# Patient Record
(demographics unavailable — no encounter records)

---

## 2025-02-18 NOTE — HEALTH RISK ASSESSMENT
[2 - 4 times a month (2 pts)] : 2-4 times a month (2 points) [3 or 4 (1 pt)] : 3 or 4  (1 point) [Never (0 pts)] : Never (0 points) [Yes] : In the past 12 months have you used drugs other than those required for medical reasons? Yes [Patient reported PAP Smear was normal] : Patient reported PAP Smear was normal [Never] : Never [0] : 2) Feeling down, depressed, or hopeless: Not at all (0) [PHQ-2 Negative - No further assessment needed] : PHQ-2 Negative - No further assessment needed [None] : None [Employed] : employed [Single] : single [Fully functional (bathing, dressing, toileting, transferring, walking, feeding)] : Fully functional (bathing, dressing, toileting, transferring, walking, feeding) [Fully functional (using the telephone, shopping, preparing meals, housekeeping, doing laundry, using] : Fully functional and needs no help or supervision to perform IADLs (using the telephone, shopping, preparing meals, housekeeping, doing laundry, using transportation, managing medications and managing finances) [Audit-CScore] : 3 [de-identified] : Smokes Marijuana  [de-identified] : walks alot at work [de-identified] : has increased candy intake [[id number]] : 0 [Reports changes in hearing] : Reports no changes in hearing [Reports changes in vision] : Reports no changes in vision [Reports changes in dental health] : Reports no changes in dental health [PapSmearDate] : 02/2024 [de-identified] : MILAN

## 2025-02-18 NOTE — HISTORY OF PRESENT ILLNESS
[FreeTextEntry1] : NPA-CPE [de-identified] : 26 year F presents for annual physical exam. 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  sleeps 7-8 hours, interrupted sleep. takes 1-2 hours to fall asleep.  sometimes feels anxious at night. Sometimes marijuana helps. Magnesium helps, but she is not consistent  Reports healthy diet. sometimes skips meals with work. Has been less active this year- has gained weight, about 10lbs She notes she moved in with boyfriend and has been eating alot of candy and sugar lately

## 2025-03-21 NOTE — HISTORY OF PRESENT ILLNESS
[FreeTextEntry8] : Acute care visit Traveled to [facility] on 3/4 with boyfriend. She stayed in an AirBnB and ate locally.  She has had diarrhea since then.  Abdominal cramping, bloating, loose stool quickly after eating anything.  denies fever, chills, weight loss.

## 2025-06-27 NOTE — HISTORY OF PRESENT ILLNESS
[Other Location: e.g. School (Enter Location, City,State)___] : at [unfilled], at the time of the visit. [Medical Office: ([facility])___] : at the medical office located in  [Telehealth (audio & video)] : This visit was provided via telehealth using real-time 2-way audio visual technology. [Verbal consent obtained from patient] : the patient, [unfilled] [de-identified] : Patient states she has been vaping nicotine and wants to quit. She has stopped cold turkey, which does not work.  She smokes 1 cartridge a week, unsure how much nicotine it contains.